# Patient Record
Sex: FEMALE | Race: AMERICAN INDIAN OR ALASKA NATIVE | URBAN - METROPOLITAN AREA
[De-identification: names, ages, dates, MRNs, and addresses within clinical notes are randomized per-mention and may not be internally consistent; named-entity substitution may affect disease eponyms.]

---

## 2022-03-12 ENCOUNTER — EMERGENCY (EMERGENCY)
Facility: HOSPITAL | Age: 57
LOS: 1 days | Discharge: HOME | End: 2022-03-12
Attending: EMERGENCY MEDICINE | Admitting: EMERGENCY MEDICINE
Payer: MEDICAID

## 2022-03-12 VITALS
WEIGHT: 160.06 LBS | SYSTOLIC BLOOD PRESSURE: 150 MMHG | RESPIRATION RATE: 18 BRPM | TEMPERATURE: 98 F | OXYGEN SATURATION: 99 % | DIASTOLIC BLOOD PRESSURE: 85 MMHG | HEART RATE: 87 BPM

## 2022-03-12 VITALS
TEMPERATURE: 98 F | DIASTOLIC BLOOD PRESSURE: 67 MMHG | SYSTOLIC BLOOD PRESSURE: 147 MMHG | OXYGEN SATURATION: 98 % | HEART RATE: 76 BPM | RESPIRATION RATE: 18 BRPM

## 2022-03-12 DIAGNOSIS — R55 SYNCOPE AND COLLAPSE: ICD-10-CM

## 2022-03-12 DIAGNOSIS — N64.4 MASTODYNIA: ICD-10-CM

## 2022-03-12 DIAGNOSIS — Z88.6 ALLERGY STATUS TO ANALGESIC AGENT: ICD-10-CM

## 2022-03-12 DIAGNOSIS — I10 ESSENTIAL (PRIMARY) HYPERTENSION: ICD-10-CM

## 2022-03-12 DIAGNOSIS — R07.9 CHEST PAIN, UNSPECIFIED: ICD-10-CM

## 2022-03-12 DIAGNOSIS — Z20.822 CONTACT WITH AND (SUSPECTED) EXPOSURE TO COVID-19: ICD-10-CM

## 2022-03-12 DIAGNOSIS — E78.5 HYPERLIPIDEMIA, UNSPECIFIED: ICD-10-CM

## 2022-03-12 DIAGNOSIS — E11.65 TYPE 2 DIABETES MELLITUS WITH HYPERGLYCEMIA: ICD-10-CM

## 2022-03-12 DIAGNOSIS — R19.7 DIARRHEA, UNSPECIFIED: ICD-10-CM

## 2022-03-12 LAB
ALBUMIN SERPL ELPH-MCNC: 3.6 G/DL — SIGNIFICANT CHANGE UP (ref 3.4–5)
ALP SERPL-CCNC: 72 U/L — SIGNIFICANT CHANGE UP (ref 40–120)
ALT FLD-CCNC: 17 U/L — SIGNIFICANT CHANGE UP (ref 12–42)
ANION GAP SERPL CALC-SCNC: 6 MMOL/L — LOW (ref 9–16)
ANISOCYTOSIS BLD QL: SIGNIFICANT CHANGE UP
APPEARANCE UR: CLEAR — SIGNIFICANT CHANGE UP
APTT BLD: 27.2 SEC — LOW (ref 27.5–35.5)
AST SERPL-CCNC: 11 U/L — LOW (ref 15–37)
BASOPHILS # BLD AUTO: 0.02 K/UL — SIGNIFICANT CHANGE UP (ref 0–0.2)
BASOPHILS NFR BLD AUTO: 0.3 % — SIGNIFICANT CHANGE UP (ref 0–2)
BILIRUB SERPL-MCNC: 0.8 MG/DL — SIGNIFICANT CHANGE UP (ref 0.2–1.2)
BILIRUB UR-MCNC: NEGATIVE — SIGNIFICANT CHANGE UP
BUN SERPL-MCNC: 14 MG/DL — SIGNIFICANT CHANGE UP (ref 7–23)
CALCIUM SERPL-MCNC: 8.8 MG/DL — SIGNIFICANT CHANGE UP (ref 8.5–10.5)
CHLORIDE SERPL-SCNC: 101 MMOL/L — SIGNIFICANT CHANGE UP (ref 96–108)
CO2 SERPL-SCNC: 26 MMOL/L — SIGNIFICANT CHANGE UP (ref 22–31)
COLOR SPEC: YELLOW — SIGNIFICANT CHANGE UP
CREAT SERPL-MCNC: 0.55 MG/DL — SIGNIFICANT CHANGE UP (ref 0.5–1.3)
D DIMER BLD IA.RAPID-MCNC: <187 NG/ML DDU — SIGNIFICANT CHANGE UP
DIFF PNL FLD: NEGATIVE — SIGNIFICANT CHANGE UP
EGFR: 106 ML/MIN/1.73M2 — SIGNIFICANT CHANGE UP
EOSINOPHIL # BLD AUTO: 0.05 K/UL — SIGNIFICANT CHANGE UP (ref 0–0.5)
EOSINOPHIL NFR BLD AUTO: 0.7 % — SIGNIFICANT CHANGE UP (ref 0–6)
GLUCOSE BLDC GLUCOMTR-MCNC: 264 MG/DL — HIGH (ref 70–99)
GLUCOSE BLDC GLUCOMTR-MCNC: 330 MG/DL — HIGH (ref 70–99)
GLUCOSE SERPL-MCNC: 357 MG/DL — HIGH (ref 70–99)
GLUCOSE UR QL: >=1000
HCT VFR BLD CALC: 32 % — LOW (ref 34.5–45)
HGB BLD-MCNC: 9.2 G/DL — LOW (ref 11.5–15.5)
HYPOCHROMIA BLD QL: SIGNIFICANT CHANGE UP
IMM GRANULOCYTES NFR BLD AUTO: 0.4 % — SIGNIFICANT CHANGE UP (ref 0–1.5)
INR BLD: 1.07 — SIGNIFICANT CHANGE UP (ref 0.88–1.16)
KETONES UR-MCNC: NEGATIVE — SIGNIFICANT CHANGE UP
LEUKOCYTE ESTERASE UR-ACNC: NEGATIVE — SIGNIFICANT CHANGE UP
LIDOCAIN IGE QN: 97 U/L — SIGNIFICANT CHANGE UP (ref 73–393)
LYMPHOCYTES # BLD AUTO: 1.05 K/UL — SIGNIFICANT CHANGE UP (ref 1–3.3)
LYMPHOCYTES # BLD AUTO: 13.9 % — SIGNIFICANT CHANGE UP (ref 13–44)
MACROCYTES BLD QL: SIGNIFICANT CHANGE UP
MAGNESIUM SERPL-MCNC: 1.8 MG/DL — SIGNIFICANT CHANGE UP (ref 1.6–2.6)
MANUAL SMEAR VERIFICATION: SIGNIFICANT CHANGE UP
MCHC RBC-ENTMCNC: 21 PG — LOW (ref 27–34)
MCHC RBC-ENTMCNC: 28.8 GM/DL — LOW (ref 32–36)
MCV RBC AUTO: 73.1 FL — LOW (ref 80–100)
MONOCYTES # BLD AUTO: 0.35 K/UL — SIGNIFICANT CHANGE UP (ref 0–0.9)
MONOCYTES NFR BLD AUTO: 4.6 % — SIGNIFICANT CHANGE UP (ref 2–14)
NEUTROPHILS # BLD AUTO: 6.05 K/UL — SIGNIFICANT CHANGE UP (ref 1.8–7.4)
NEUTROPHILS NFR BLD AUTO: 80.1 % — HIGH (ref 43–77)
NITRITE UR-MCNC: NEGATIVE — SIGNIFICANT CHANGE UP
NRBC # BLD: 0 /100 WBCS — SIGNIFICANT CHANGE UP (ref 0–0)
OVALOCYTES BLD QL SMEAR: SLIGHT — SIGNIFICANT CHANGE UP
PH UR: 6 — SIGNIFICANT CHANGE UP (ref 5–8)
PLAT MORPH BLD: NORMAL — SIGNIFICANT CHANGE UP
PLATELET # BLD AUTO: 337 K/UL — SIGNIFICANT CHANGE UP (ref 150–400)
POLYCHROMASIA BLD QL SMEAR: SLIGHT — SIGNIFICANT CHANGE UP
POTASSIUM SERPL-MCNC: 4.5 MMOL/L — SIGNIFICANT CHANGE UP (ref 3.5–5.3)
POTASSIUM SERPL-SCNC: 4.5 MMOL/L — SIGNIFICANT CHANGE UP (ref 3.5–5.3)
PROT SERPL-MCNC: 7.1 G/DL — SIGNIFICANT CHANGE UP (ref 6.4–8.2)
PROT UR-MCNC: NEGATIVE MG/DL — SIGNIFICANT CHANGE UP
PROTHROM AB SERPL-ACNC: 12.5 SEC — SIGNIFICANT CHANGE UP (ref 10.5–13.4)
RBC # BLD: 4.38 M/UL — SIGNIFICANT CHANGE UP (ref 3.8–5.2)
RBC # FLD: 15.2 % — HIGH (ref 10.3–14.5)
RBC BLD AUTO: ABNORMAL
SARS-COV-2 RNA SPEC QL NAA+PROBE: SIGNIFICANT CHANGE UP
SODIUM SERPL-SCNC: 133 MMOL/L — SIGNIFICANT CHANGE UP (ref 132–145)
SP GR SPEC: 1.02 — SIGNIFICANT CHANGE UP (ref 1–1.03)
TARGETS BLD QL SMEAR: SIGNIFICANT CHANGE UP
TROPONIN I, HIGH SENSITIVITY RESULT: 4 NG/L — SIGNIFICANT CHANGE UP
TROPONIN I, HIGH SENSITIVITY RESULT: 4.1 NG/L — SIGNIFICANT CHANGE UP
UROBILINOGEN FLD QL: 0.2 E.U./DL — SIGNIFICANT CHANGE UP
WBC # BLD: 7.55 K/UL — SIGNIFICANT CHANGE UP (ref 3.8–10.5)
WBC # FLD AUTO: 7.55 K/UL — SIGNIFICANT CHANGE UP (ref 3.8–10.5)

## 2022-03-12 PROCEDURE — 99285 EMERGENCY DEPT VISIT HI MDM: CPT | Mod: 25

## 2022-03-12 PROCEDURE — 93010 ELECTROCARDIOGRAM REPORT: CPT | Mod: 76

## 2022-03-12 PROCEDURE — 71045 X-RAY EXAM CHEST 1 VIEW: CPT | Mod: 26

## 2022-03-12 RX ORDER — INSULIN HUMAN 100 [IU]/ML
6 INJECTION, SOLUTION SUBCUTANEOUS ONCE
Refills: 0 | Status: COMPLETED | OUTPATIENT
Start: 2022-03-12 | End: 2022-03-12

## 2022-03-12 RX ORDER — SODIUM CHLORIDE 9 MG/ML
1000 INJECTION INTRAMUSCULAR; INTRAVENOUS; SUBCUTANEOUS ONCE
Refills: 0 | Status: COMPLETED | OUTPATIENT
Start: 2022-03-12 | End: 2022-03-12

## 2022-03-12 RX ORDER — FAMOTIDINE 10 MG/ML
20 INJECTION INTRAVENOUS ONCE
Refills: 0 | Status: COMPLETED | OUTPATIENT
Start: 2022-03-12 | End: 2022-03-12

## 2022-03-12 RX ORDER — ONDANSETRON 8 MG/1
4 TABLET, FILM COATED ORAL ONCE
Refills: 0 | Status: COMPLETED | OUTPATIENT
Start: 2022-03-12 | End: 2022-03-12

## 2022-03-12 RX ORDER — SODIUM CHLORIDE 9 MG/ML
1000 INJECTION INTRAMUSCULAR; INTRAVENOUS; SUBCUTANEOUS
Refills: 0 | Status: DISCONTINUED | OUTPATIENT
Start: 2022-03-12 | End: 2022-03-12

## 2022-03-12 RX ADMIN — ONDANSETRON 4 MILLIGRAM(S): 8 TABLET, FILM COATED ORAL at 09:10

## 2022-03-12 RX ADMIN — FAMOTIDINE 20 MILLIGRAM(S): 10 INJECTION INTRAVENOUS at 09:10

## 2022-03-12 RX ADMIN — INSULIN HUMAN 6 UNIT(S): 100 INJECTION, SOLUTION SUBCUTANEOUS at 12:20

## 2022-03-12 RX ADMIN — SODIUM CHLORIDE 1000 MILLILITER(S): 9 INJECTION INTRAMUSCULAR; INTRAVENOUS; SUBCUTANEOUS at 12:24

## 2022-03-12 RX ADMIN — SODIUM CHLORIDE 2000 MILLILITER(S): 9 INJECTION INTRAMUSCULAR; INTRAVENOUS; SUBCUTANEOUS at 09:27

## 2022-03-12 NOTE — ED ADULT TRIAGE NOTE - CHIEF COMPLAINT QUOTE
here for left sided chest pain with nausea and now 1 episode of diarrhea- h/o DM and htn- FS in the field 411

## 2022-03-12 NOTE — ED PROVIDER NOTE - CARE PLAN
1 Principal Discharge DX:	Breast pain  Secondary Diagnosis:	Near syncope  Secondary Diagnosis:	Hyperglycemia

## 2022-03-12 NOTE — ED PROVIDER NOTE - SKIN, MLM
Statement Selected +Focal tenderness lateral to the left nipple, no masses, no skin changes, no palpated mass, no nipple discharge.

## 2022-03-12 NOTE — ED ADULT NURSE NOTE - DOES PATIENT HAVE ADVANCE DIRECTIVE
Left a message for Karlene to call the office- I canceled her CT chest that was scheduled on 5/17/21, per Dr Steele, after reviewing her last scans, this CT is no longer needed.   
Patient called back into the office. Relayed information from message. Patient verbally acknowledged.  
No

## 2022-03-12 NOTE — ED PROVIDER NOTE - CLINICAL SUMMARY MEDICAL DECISION MAKING FREE TEXT BOX
Pt presenting with apparent near syncope in the setting of breast pain and an episode of diarrhea in the ED. The episode seems most consistent with a vasovagal episode. Will check labs and hydrate, antiemetics given. Pt may require some insulin in ED for hyperglycemia. Anticipate discharge.

## 2022-03-12 NOTE — ED PROVIDER NOTE - OBJECTIVE STATEMENT
55 y/o F got on the train this morning in [place] commuting to work and began to feel pain in the center of her left breast associated with this she began to feel sweaty overheated and faint she loosened up her jacket but the symptoms persisted and she became nauseous hs decided to get off of the train at [address] because she felt too sick she activated EMS bc she was worried about her sympt  her bs was 411 with EMS upon arriv to ED she ahd a BM/ episode of diarrhea she is still .co pain in the center of her left breast but reports othery sym hasve sunbsided no hx of fainting or cardiac disease she does have a HC of DM HTN Adn HLD her sugars at time as high as 200 she took her Victosa last night as well as her metformin this morning she is overdue for a mamogram but has not noticed any abdnormalities in either breast recently she had COVID earlier this year and is fvd and boosted   she had HX of bowel surgery and since then she has frequent episodes of d that normally last about an hour   (commuting to work in [place])   No fevers or chills 57 y/o F with Hx of HTN, DM, and HLD, who was commuting to work in [place] and got on the train this morning in [place] and began to feel pain in the center of her left breast, associated with this she began to feel sweaty, overheated, and faint. She loosened up her jacket but the symptoms persisted and she became nauseous. She decided to get off of the train at [address] because she felt too sick. She activated EMS because she was worried about her symptoms. Her blood sugar was 411 with EMS. Upon arrival to the ED she had a bowel movement/episode of diarrhea she is still complaining of pain in the center of her left breast but reports other symptoms have subsided. She has no Hx of fainting or cardiac disease. She reports her sugars at time are as high as 200 and she took her Victoza last night as well as her Metformin this morning. She is overdue for a mammogram but has not noticed any abnormalities in either breast recently. She had COVID earlier this year and is fully vaccinated for COVID and boosted. She has a Hx of bowel surgery and since then she has frequent episodes of diarrhea that normally lasts about an hour. No fevers or chills.

## 2022-03-12 NOTE — ED PROVIDER NOTE - NSFOLLOWUPINSTRUCTIONS_ED_ALL_ED_FT
Near (almost) Syncope    Likely caused by acute breat pain and high blood sugar. Please set up a mammogram to be sure that there are no abnormal findings. I did not feel a mass.     Please check your blood sugars regularly.     Syncope is when you temporarily lose consciousness, also called fainting or passing out. It is caused by a sudden decrease in blood flow to the brain. Even though most causes of syncope are not dangerous, syncope can possibly be a sign of a serious medical problem. Signs that you may be about to faint include feeling dizzy, lightheaded, nausea, visual changes, or cold/clammy skin. Do not drive, operate heavy machinery, or play sports until your health care provider says it is okay.    SEEK IMMEDIATE MEDICAL CARE IF YOU HAVE ANY OF THE FOLLOWING SYMPTOMS: severe headache, pain in your chest/abdomen/back, bleeding from your mouth or rectum, palpitations, shortness of breath, pain with breathing, seizure, confusion, or trouble walking.

## 2022-03-12 NOTE — ED ADULT NURSE NOTE - OBJECTIVE STATEMENT
PT was on the train on her way to work. Developed left sided chest pain and diaphoresis. HX of diabetes, on insulin in the pm.

## 2022-03-12 NOTE — ED PROVIDER NOTE - PATIENT PORTAL LINK FT
You can access the FollowMyHealth Patient Portal offered by [facility] by registering at the following website: [email or web address]. By joining [employer]’s FollowMyHealth portal, you will also be able to view your health information using other applications (apps) compatible with our system.

## 2025-07-11 PROBLEM — I10 ESSENTIAL (PRIMARY) HYPERTENSION: Chronic | Status: ACTIVE | Noted: 2022-03-12

## 2025-07-11 PROBLEM — E78.5 HYPERLIPIDEMIA, UNSPECIFIED: Chronic | Status: ACTIVE | Noted: 2022-03-12

## 2025-07-11 PROBLEM — E11.9 TYPE 2 DIABETES MELLITUS WITHOUT COMPLICATIONS: Chronic | Status: ACTIVE | Noted: 2022-03-12

## 2025-07-17 ENCOUNTER — OUTPATIENT (OUTPATIENT)
Dept: OUTPATIENT SERVICES | Facility: HOSPITAL | Age: 60
LOS: 1 days | End: 2025-07-17
Payer: MEDICAID

## 2025-07-17 ENCOUNTER — TRANSCRIPTION ENCOUNTER (OUTPATIENT)
Age: 60
End: 2025-07-17

## 2025-07-17 VITALS
WEIGHT: 123.9 LBS | HEART RATE: 73 BPM | DIASTOLIC BLOOD PRESSURE: 99 MMHG | RESPIRATION RATE: 18 BRPM | SYSTOLIC BLOOD PRESSURE: 149 MMHG | OXYGEN SATURATION: 99 % | HEIGHT: 62 IN | TEMPERATURE: 98 F

## 2025-07-17 VITALS
SYSTOLIC BLOOD PRESSURE: 147 MMHG | RESPIRATION RATE: 18 BRPM | OXYGEN SATURATION: 99 % | HEART RATE: 68 BPM | DIASTOLIC BLOOD PRESSURE: 68 MMHG

## 2025-07-17 DIAGNOSIS — Z90.49 ACQUIRED ABSENCE OF OTHER SPECIFIED PARTS OF DIGESTIVE TRACT: Chronic | ICD-10-CM

## 2025-07-17 DIAGNOSIS — R06.02 SHORTNESS OF BREATH: ICD-10-CM

## 2025-07-17 LAB
ANION GAP SERPL CALC-SCNC: 10 MMOL/L — SIGNIFICANT CHANGE UP (ref 7–14)
BUN SERPL-MCNC: 14 MG/DL — SIGNIFICANT CHANGE UP (ref 7–23)
CALCIUM SERPL-MCNC: 9.4 MG/DL — SIGNIFICANT CHANGE UP (ref 8.4–10.5)
CHLORIDE SERPL-SCNC: 98 MMOL/L — SIGNIFICANT CHANGE UP (ref 98–107)
CO2 SERPL-SCNC: 25 MMOL/L — SIGNIFICANT CHANGE UP (ref 22–31)
CREAT SERPL-MCNC: 0.43 MG/DL — LOW (ref 0.5–1.3)
EGFR: 111 ML/MIN/1.73M2 — SIGNIFICANT CHANGE UP
EGFR: 111 ML/MIN/1.73M2 — SIGNIFICANT CHANGE UP
GLUCOSE BLDC GLUCOMTR-MCNC: 266 MG/DL — HIGH (ref 70–99)
GLUCOSE BLDC GLUCOMTR-MCNC: 308 MG/DL — HIGH (ref 70–99)
GLUCOSE SERPL-MCNC: 312 MG/DL — HIGH (ref 70–99)
HCT VFR BLD CALC: 39.6 % — SIGNIFICANT CHANGE UP (ref 34.5–45)
HGB BLD-MCNC: 12.8 G/DL — SIGNIFICANT CHANGE UP (ref 11.5–15.5)
MCHC RBC-ENTMCNC: 24.4 PG — LOW (ref 27–34)
MCHC RBC-ENTMCNC: 32.3 G/DL — SIGNIFICANT CHANGE UP (ref 32–36)
MCV RBC AUTO: 75.4 FL — LOW (ref 80–100)
NRBC # BLD AUTO: 0 K/UL — SIGNIFICANT CHANGE UP (ref 0–0)
NRBC # FLD: 0 K/UL — SIGNIFICANT CHANGE UP (ref 0–0)
NRBC BLD AUTO-RTO: 0 /100 WBCS — SIGNIFICANT CHANGE UP (ref 0–0)
PLATELET # BLD AUTO: 295 K/UL — SIGNIFICANT CHANGE UP (ref 150–400)
PMV BLD: 9 FL — SIGNIFICANT CHANGE UP (ref 7–13)
POTASSIUM SERPL-MCNC: 4.5 MMOL/L — SIGNIFICANT CHANGE UP (ref 3.5–5.3)
POTASSIUM SERPL-SCNC: 4.5 MMOL/L — SIGNIFICANT CHANGE UP (ref 3.5–5.3)
RBC # BLD: 5.25 M/UL — HIGH (ref 3.8–5.2)
RBC # FLD: 23.6 % — HIGH (ref 10.3–14.5)
SODIUM SERPL-SCNC: 133 MMOL/L — LOW (ref 135–145)
WBC # BLD: 7.18 K/UL — SIGNIFICANT CHANGE UP (ref 3.8–10.5)
WBC # FLD AUTO: 7.18 K/UL — SIGNIFICANT CHANGE UP (ref 3.8–10.5)

## 2025-07-17 PROCEDURE — 93454 CORONARY ARTERY ANGIO S&I: CPT | Mod: 26,59

## 2025-07-17 PROCEDURE — 93010 ELECTROCARDIOGRAM REPORT: CPT

## 2025-07-17 PROCEDURE — 99152 MOD SED SAME PHYS/QHP 5/>YRS: CPT

## 2025-07-17 PROCEDURE — 92928 PRQ TCAT PLMT NTRAC ST 1 LES: CPT | Mod: RC

## 2025-07-17 RX ORDER — INSULIN LISPRO 100 U/ML
3 INJECTION, SOLUTION INTRAVENOUS; SUBCUTANEOUS ONCE
Refills: 0 | Status: COMPLETED | OUTPATIENT
Start: 2025-07-17 | End: 2025-07-17

## 2025-07-17 RX ORDER — INSULIN LISPRO 100 U/ML
INJECTION, SOLUTION INTRAVENOUS; SUBCUTANEOUS
Refills: 0 | Status: DISCONTINUED | OUTPATIENT
Start: 2025-07-17 | End: 2025-07-31

## 2025-07-17 RX ORDER — SODIUM CHLORIDE 9 G/1000ML
1000 INJECTION, SOLUTION INTRAVENOUS
Refills: 0 | Status: DISCONTINUED | OUTPATIENT
Start: 2025-07-17 | End: 2025-07-31

## 2025-07-17 RX ORDER — METFORMIN HYDROCHLORIDE 850 MG/1
1 TABLET ORAL
Qty: 0 | Refills: 0 | DISCHARGE

## 2025-07-17 RX ORDER — LABETALOL HYDROCHLORIDE 200 MG/1
5 TABLET, FILM COATED ORAL ONCE
Refills: 0 | Status: COMPLETED | OUTPATIENT
Start: 2025-07-17 | End: 2025-07-17

## 2025-07-17 RX ORDER — GLUCAGON 3 MG/1
1 POWDER NASAL ONCE
Refills: 0 | Status: DISCONTINUED | OUTPATIENT
Start: 2025-07-17 | End: 2025-07-31

## 2025-07-17 RX ORDER — SITAGLIPTIN 100 MG/1
1 TABLET, FILM COATED ORAL
Qty: 3 | Refills: 0
Start: 2025-07-17 | End: 2025-07-19

## 2025-07-17 RX ORDER — ASPIRIN 325 MG
1 TABLET ORAL
Refills: 0 | DISCHARGE

## 2025-07-17 RX ORDER — ROSUVASTATIN CALCIUM 20 MG/1
1 TABLET, FILM COATED ORAL
Refills: 0 | DISCHARGE

## 2025-07-17 RX ORDER — DEXTROSE 50 % IN WATER 50 %
12.5 SYRINGE (ML) INTRAVENOUS ONCE
Refills: 0 | Status: DISCONTINUED | OUTPATIENT
Start: 2025-07-17 | End: 2025-07-31

## 2025-07-17 RX ORDER — RAMIPRIL 2.5 MG/1
1 CAPSULE ORAL
Refills: 0 | DISCHARGE

## 2025-07-17 RX ORDER — ASPIRIN 325 MG
1 TABLET ORAL
Qty: 0 | Refills: 0 | DISCHARGE

## 2025-07-17 RX ORDER — RAMIPRIL 2.5 MG/1
0 CAPSULE ORAL
Refills: 0 | DISCHARGE

## 2025-07-17 RX ORDER — DEXTROSE 50 % IN WATER 50 %
25 SYRINGE (ML) INTRAVENOUS ONCE
Refills: 0 | Status: DISCONTINUED | OUTPATIENT
Start: 2025-07-17 | End: 2025-07-31

## 2025-07-17 RX ORDER — METFORMIN HYDROCHLORIDE 850 MG/1
1 TABLET ORAL
Refills: 0 | DISCHARGE

## 2025-07-17 RX ORDER — DEXTROSE 50 % IN WATER 50 %
15 SYRINGE (ML) INTRAVENOUS ONCE
Refills: 0 | Status: DISCONTINUED | OUTPATIENT
Start: 2025-07-17 | End: 2025-07-31

## 2025-07-17 RX ORDER — CLOPIDOGREL BISULFATE 75 MG/1
1 TABLET, FILM COATED ORAL
Qty: 30 | Refills: 11
Start: 2025-07-17 | End: 2026-07-11

## 2025-07-17 RX ADMIN — Medication 100 MILLILITER(S): at 10:57

## 2025-07-17 RX ADMIN — LABETALOL HYDROCHLORIDE 5 MILLIGRAM(S): 200 TABLET, FILM COATED ORAL at 10:56

## 2025-07-17 RX ADMIN — Medication 1000 MILLILITER(S): at 08:50

## 2025-07-17 RX ADMIN — INSULIN LISPRO 3 UNIT(S): 100 INJECTION, SOLUTION INTRAVENOUS; SUBCUTANEOUS at 10:30

## 2025-07-17 NOTE — H&P CARDIOLOGY - HISTORY OF PRESENT ILLNESS
59 y.o. female with PMH of HTN, HLD, Diabetes Mellitus 2,  presents today for elective cardiac catheterization. The patient denies chest pain, SOB, palpitations, presyncope, syncope,  headache, visual disturbances, CVA, PE, DVT, ELIJAH, abdominal pain, N/V/D/C, hematochezia, melena, dysuria, hematuria, fever, chills. The patient was evaluated by a cardiologist. Due to patient's symptoms and abnormal non invasive findings, the patient  was recommended to have cardiac catheterization. The patient denies any complaints at present.      59 y.o. female with PMH of HTN, HLD, Diabetes Mellitus 2,  Anemia (s/p blood transfusion 1.5 months ago, pending GI appointment), presents today for elective cardiac catheterization. The patient c/o SOB with exertion started 1.5 months ago. The patient denies chest pain, palpitations, presyncope, syncope,  headache, visual disturbances, CVA, PE, DVT, ELIJAH, abdominal pain, N/V/D/C, hematochezia, melena, dysuria, hematuria, fever, chills. The patient was evaluated by a cardiologist, was found to have abnormal stress test. Due to patient's symptoms and abnormal non invasive findings, the patient  was recommended to have cardiac catheterization. The patient denies any complaints at present.     As per patient, she had allergic reaction to ASA in [place], but has not [place] allergy in [place] and takes it daily.

## 2025-07-17 NOTE — ASU DISCHARGE PLAN (ADULT/PEDIATRIC) - ASU DC SPECIAL INSTRUCTIONSFT
Your procedure was performed through the  WRI[facility]  For the next 3 days:  - Avoid pushing and pulling with the affected wrist.   - Avoid repeated movement of the affected hand and wrist (typing, hammering).  - Do not lift anything more than 5 pounds.    MEDICATION:   - Take your medications as explained (see attached medication reconciliation on discharge paperwork).  - If you had a stent placed, you will be taking antiplatelet medications to keep your stent open (examples: Aspirin, Plavix, Brilinta, Effient). Take your medications as prescribed. Do not stop taking them without consulting with your cardiologist/vascular surgeon first.    ADDITIONAL INSTRUCTIONS:  - Follow a heart healthy diet recommended by your doctor.   - If you smoke, we encourage smoking cessation. You may call [phone] for center of tobacco control if you need assistance.  - Call your doctor to make appointment within 2 weeks.    ***CALL YOUR DOCTOR***  - If you experience fever, chills, body aches, or severe pain, swelling, redness, heat, or yellow discharge from your incision site.  - If you notice bleeding or significant swelling at the procedural site.  - If you experience chest pain.  - If you experience extremity numbness, tingling, or temperature change.    If you are unable to get in contact with your doctor, you may contact the [facility] at [phone].  Please reference your discharge instructions for any questions or concerns.

## 2025-07-17 NOTE — ASU DISCHARGE PLAN (ADULT/PEDIATRIC) - COMMENTS
- Start PLAVIX 75 mg 1 tab/day. First dose to be taken on 7/18/25  - Take ASPIRIN 81mg 1 tab/day daily.   - Hold Metformin for 3days, restart on 7/20/25  - Start Januvia 25 mg (for diabetes) 1 tab /day for 3 days, stop on 7/20/25

## 2025-07-17 NOTE — CONSULT NOTE ADULT - SUBJECTIVE AND OBJECTIVE BOX
Ramon Downing MD  Interventional Cardiology / Endovascular Specialist  [place] Office : [phone] [place] [address] [place] [address]  Tel:   [place] Office : [address] [facility] [place] [address]  Tel: [phone]        HISTORY OF PRESENTING ILLNESS:  59 y.o. female with PMH of HTN, HLD, Diabetes Mellitus 2,  Anemia (s/p blood transfusion 1.5 months ago, pending GI appointment), presents today for elective cardiac catheterization. The patient c/o SOB with exertion started 1.5 months ago. The patient denies chest pain, palpitations, presyncope, syncope,  headache, visual disturbances, CVA, PE, DVT, ELIJAH, abdominal pain, N/V/D/C, hematochezia, melena, dysuria, hematuria, fever, chills. The patient was evaluated by a cardiologist, was found to have abnormal stress test. Due to patient's symptoms and abnormal non invasive findings, the patient  was recommended to have cardiac catheterization. The patient denies any complaints at present.     As per patient, she had allergic reaction to [place], but [place] allergy in [place] and takes it daily.    PAST MEDICAL & SURGICAL HISTORY:  HTN (hypertension)      HLD (hyperlipidemia)      DM (diabetes mellitus)      Anemia      S/P partial colectomy          SOCIAL HISTORY: Substance Use (street drugs): ( x ) never used  (  ) other:    FAMILY HISTORY:  Family history of CVA (Father, Mother)        REVIEW OF SYSTEMS:  CONSTITUTIONAL: No fever, weight loss, or fatigue  EYES: No eye pain, visual disturbances, or discharge  ENMT:  No difficulty hearing, tinnitus, vertigo; No sinus or throat pain  BREASTS: No pain, masses, or nipple discharge  GASTROINTESTINAL: No abdominal or epigastric pain. No nausea, vomiting, or hematemesis; No diarrhea or constipation. No melena or hematochezia.  GENITOURINARY: No dysuria, frequency, hematuria, or incontinence  NEUROLOGICAL: No headaches, memory loss, loss of strength, numbness, or tremors  ENDOCRINE: No heat or cold intolerance; No hair loss  MUSCULOSKELETAL: No joint pain or swelling; No muscle, back, or extremity pain  PSYCHIATRIC: No depression, anxiety, mood swings, or difficulty sleeping  HEME/LYMPH: No easy bruising, or bleeding gums  All others negative    MEDICATIONS:            dextrose 50% Injectable 25 Gram(s) IV Push once  dextrose 50% Injectable 12.5 Gram(s) IV Push once  dextrose 50% Injectable 25 Gram(s) IV Push once  dextrose Oral Gel 15 Gram(s) Oral once PRN  glucagon  Injectable 1 milliGRAM(s) IntraMuscular once  insulin lispro (ADMELOG) corrective regimen sliding scale   SubCutaneous three times a day before meals    dextrose 5%. 1000 milliLiter(s) IV Continuous <Continuous>  dextrose 5%. 1000 milliLiter(s) IV Continuous <Continuous>  sodium chloride 0.9% lock flush 3 milliLiter(s) IV Push every 8 hours  sodium chloride 0.9%. 500 milliLiter(s) IV Continuous <Continuous>  sodium chloride 0.9%. 1000 milliLiter(s) IV Continuous <Continuous>      FAMILY HISTORY:  Family history of CVA (Father, Mother)          Allergies    No Known Allergies    Intolerances    	      PHYSICAL EXAM:  T(C): 36.7 (07-17-25 @ 10:10), Max: 36.7 (07-17-25 @ 10:10)  HR: 68 (07-17-25 @ 15:00) (67 - 86)  BP: 147/68 (07-17-25 @ 15:00) (135/64 - 175/84)  RR: 18 (07-17-25 @ 15:00) (18 - 20)  SpO2: 99% (07-17-25 @ 15:00) (96% - 99%)  Wt(kg): --  I&O's Summary      GENERAL: NAD, well-groomed, well-developed  EYES: EOMI, PERRLA, conjunctiva and sclera clear  ENMT: No tonsillar erythema, exudates, or enlargement; Moist mucous membranes, Good dentition, No lesions  Cardiovascular: Normal S1 S2, No JVD, No murmurs, No edema  Respiratory: Lungs clear to auscultation	  Gastrointestinal:  Soft, Non-tender, + BS	  Extremities: Normal range of motion, No clubbing, cyanosis or edema  LYMPH: No lymphadenopathy noted  NERVOUS SYSTEM:  Alert & Oriented X3, Good concentration; Motor Strength 5/5 B/L upper and lower extremities; DTRs 2+ intact and symmetric      LABS:	 	    CARDIAC MARKERS:                                  12.8   7.18  )-----------( 295      ( 17 Jul 2025 08:10 )             39.6     07-17    133[L]  |  98  |  14  ----------------------------<  312[H]  4.5   |  25  |  0.43[L]    Ca    9.4      17 Jul 2025 08:10      proBNP:   Lipid Profile:   HgA1c:   TSH:     Consultant(s) Notes Reviewed:  [x ] YES  [ ] NO    Care Discussed with Consultants/Other Providers [ x] YES  [ ] NO    Imaging Personally Reviewed independently:  [x] YES  [ ] NO    All labs, radiologic studies, vitals, orders and medications list reviewed. Patient is seen and examined at bedside. Case discussed with medical team.    ASSESSMENT/PLAN:

## 2025-07-17 NOTE — H&P CARDIOLOGY - NSICDXFAMILYHX_GEN_ALL_CORE_FT
FAMILY HISTORY:  Father  Still living? Unknown  Family history of CVA, Age at diagnosis: Age Unknown    Mother  Still living? No  Family history of CVA, Age at diagnosis: Age Unknown

## 2025-07-17 NOTE — ASU PATIENT PROFILE, ADULT - FALL HARM RISK - UNIVERSAL INTERVENTIONS
Bed in lowest position, wheels locked, appropriate side rails in place/Call bell, personal items and telephone in reach/Instruct patient to call for assistance before getting out of bed or chair/Non-slip footwear when patient is out of bed/[place] to call system/Physically safe environment - no spills, clutter or unnecessary equipment/Purposeful Proactive Rounding/Room/bathroom lighting operational, light cord in reach

## 2025-07-17 NOTE — ASU DISCHARGE PLAN (ADULT/PEDIATRIC) - CARE PROVIDER_API CALL
Ramon Downing)  Interventional Cardiology  [address] [place] [id number]  Phone: [phone]  Fax: [phone]  Follow Up Time:

## 2025-07-17 NOTE — CONSULT NOTE ADULT - ASSESSMENT
Assessment and Plan     1) Cp : stress abnormal s/p RCA PCI c/w asa plavix     2) DM c/w home med s    3) HTN c/w ramipril

## 2025-07-17 NOTE — H&P CARDIOLOGY - COMMENTS
Pre-sedation evaluation    Dentures: upper  Last PO intake: 7/16/25 PM    Level of consciousness: 1  Obstructive sleep apnea: No  Aspiration risk: No  Mallampati score: 2  ASA Classification: 2  Prior Sedative or Anesthesia Experience: No complications  Informed consent by responsible adult: Yes  Responsible adult escort: Yes  Based on today's assessment, anesthesia consult requested: No

## 2025-07-17 NOTE — ASU DISCHARGE PLAN (ADULT/PEDIATRIC) - FINANCIAL ASSISTANCE
[facility] provides services at a reduced cost to those who are determined to be eligible through [facility]’s financial assistance program. Information regarding [facility]’s financial assistance program can be found by going to [email or web address] or by calling [phone].

## 2025-07-17 NOTE — H&P CARDIOLOGY - NSICDXPASTMEDICALHX_GEN_ALL_CORE_FT
PAST MEDICAL HISTORY:  DM (diabetes mellitus)     HLD (hyperlipidemia)     HTN (hypertension)      PAST MEDICAL HISTORY:  Anemia     DM (diabetes mellitus)     HLD (hyperlipidemia)     HTN (hypertension)

## 2025-07-18 PROBLEM — Z00.00 ENCOUNTER FOR PREVENTIVE HEALTH EXAMINATION: Status: ACTIVE | Noted: 2025-07-18
